# Patient Record
Sex: FEMALE | Race: WHITE | Employment: OTHER | URBAN - METROPOLITAN AREA
[De-identification: names, ages, dates, MRNs, and addresses within clinical notes are randomized per-mention and may not be internally consistent; named-entity substitution may affect disease eponyms.]

---

## 2017-08-23 ENCOUNTER — HOSPITAL ENCOUNTER (OUTPATIENT)
Dept: GENERAL RADIOLOGY | Age: 27
Discharge: HOME | End: 2017-08-23
Attending: FAMILY MEDICINE
Payer: COMMERCIAL

## 2017-08-23 DIAGNOSIS — M54.2 CERVICALGIA: ICD-10-CM

## 2017-08-23 DIAGNOSIS — M54.6 PAIN IN THORACIC SPINE: ICD-10-CM

## 2017-08-23 PROCEDURE — 72072 X-RAY EXAM THORAC SPINE 3VWS: CPT

## 2017-08-23 PROCEDURE — 72050 X-RAY EXAM NECK SPINE 4/5VWS: CPT

## 2018-01-01 ENCOUNTER — HOSPITAL ENCOUNTER (EMERGENCY)
Age: 28
Discharge: HOME | End: 2018-01-01
Attending: STUDENT IN AN ORGANIZED HEALTH CARE EDUCATION/TRAINING PROGRAM
Payer: COMMERCIAL

## 2018-01-01 VITALS
SYSTOLIC BLOOD PRESSURE: 140 MMHG | HEART RATE: 102 BPM | WEIGHT: 115.08 LBS | RESPIRATION RATE: 16 BRPM | HEIGHT: 61 IN | BODY MASS INDEX: 21.73 KG/M2 | DIASTOLIC BLOOD PRESSURE: 82 MMHG | OXYGEN SATURATION: 100 % | TEMPERATURE: 98.1 F

## 2018-01-01 DIAGNOSIS — H66.90 ACUTE OTITIS MEDIA, UNSPECIFIED OTITIS MEDIA TYPE: ICD-10-CM

## 2018-01-01 DIAGNOSIS — H92.09 EAR ACHE: Primary | ICD-10-CM

## 2018-01-01 PROCEDURE — 99282 EMERGENCY DEPT VISIT SF MDM: CPT

## 2018-01-01 RX ORDER — AZITHROMYCIN 250 MG/1
TABLET, FILM COATED ORAL
Qty: 6 TAB | Refills: 0 | Status: SHIPPED | OUTPATIENT
Start: 2018-01-01 | End: 2018-01-06

## 2018-01-01 RX ORDER — TRIAMCINOLONE ACETONIDE 55 UG/1
2 SPRAY, METERED NASAL DAILY
Qty: 1 BOTTLE | Refills: 0 | Status: SHIPPED | OUTPATIENT
Start: 2018-01-01 | End: 2018-10-11

## 2018-01-01 NOTE — ED PROVIDER NOTES
HPI Comments: 24 y.o. female with no significant past medical history who presents from home with chief complaint of right ear pain. The patient states that she hasn't been feeling well x 2 days and this am she awoke with right ear pain. The patient states that her daughter has had repeated bouts of strep all year. The patient states that she does smoke a PPD. The patient also states that she is also worried that she has a bad tooth in the back of her mouth. There are no other acute medical concerns at this time. PCP: Reyes Leeks, MD    Past Medical History:  No date: Diabetes [facility])  No date: Hypertension      The history is provided by the patient. No  was used. Past Medical History:   Diagnosis Date    Diabetes ([address]     Hypertension        Past Surgical History:   Procedure Laterality Date    HX  SECTION      x2    HX DILATION AND CURETTAGE           Family History:   Problem Relation Age of Onset    COPD Mother     Drug Abuse Mother     Diabetes Maternal Grandfather     Cancer Paternal Grandmother     Diabetes Paternal Grandfather        Social History     Social History    Marital status:      Spouse name: N/A    Number of children: N/A    Years of education: N/A     Occupational History    Not on file. Social History Main Topics    Smoking status: Current Every Day Smoker     Packs/day: 1.00    Smokeless tobacco: Never Used    Alcohol use 3.5 oz/week     7 Cans of beer per week      Comment: 1 per night    Drug use: No    Sexual activity: Yes     Partners: Male     Birth control/ protection:      Other Topics Concern    Not on file     Social History Narrative         ALLERGIES: Penicillins    Review of Systems   Constitutional: Positive for chills and fever (Subjective). Negative for diaphoresis. HENT: Positive for dental problem, ear pain and sore throat. Negative for congestion, rhinorrhea and trouble swallowing.     Eyes: Negative. Respiratory: Negative. Negative for shortness of breath. Cardiovascular: Negative. Gastrointestinal: Negative. Negative for abdominal pain, nausea and vomiting. Endocrine: Negative. Musculoskeletal: Negative for arthralgias, myalgias, neck pain and neck stiffness. Skin: Negative. Negative for rash. Allergic/Immunologic: Negative. Neurological: Negative. Negative for dizziness, syncope, weakness and headaches. Hematological: Negative. Psychiatric/Behavioral: Negative. Vitals:    01/01/18 1237   BP: 140/82   Pulse: (!) 102   Resp: 16   Temp: 98.1 °F (36.7 °C)   SpO2: 100%   Weight: 52.2 kg (115 lb 1.3 oz)   Height: 5' 1\" (1.549 m)            Physical Exam   Constitutional: She is oriented to person, place, and time. Vital signs are normal. She appears well-developed and well-nourished. Non-toxic appearance. She does not have a sickly appearance. She does not appear ill. HENT:   Head: Normocephalic and atraumatic. Right Ear: Hearing, external ear and ear canal normal. Tympanic membrane is erythematous. Left Ear: Hearing, tympanic membrane, external ear and ear canal normal.   Nose: Nose normal.   Mouth/Throat: Uvula is midline and mucous membranes are normal. Oropharyngeal exudate and posterior oropharyngeal erythema present. Eyes: Conjunctivae, EOM and lids are normal. Pupils are equal, round, and reactive to light. Neck: Trachea normal, normal range of motion and full passive range of motion without pain. Neck supple. Cardiovascular: Normal rate, regular rhythm, normal heart sounds and normal pulses. Pulmonary/Chest: Effort normal and breath sounds normal. She has no decreased breath sounds. Abdominal: Soft. Normal appearance and bowel sounds are normal.   Musculoskeletal: Normal range of motion. Neurological: She is alert and oriented to person, place, and time. She has normal strength. GCS eye subscore is 4. GCS verbal subscore is 5.  GCS motor subscore is 6. Skin: Skin is warm, dry and intact. Psychiatric: She has a normal mood and affect. Her speech is normal and behavior is normal. Judgment and thought content normal. Cognition and memory are normal.   Nursing note and vitals reviewed. MDM  Number of Diagnoses or Management Options  Acute otitis media, unspecified otitis media type: minor  Ear ache: minor  Risk of Complications, Morbidity, and/or Mortality  Presenting problems: minimal  Diagnostic procedures: minimal  Management options: minimal    Patient Progress  Patient progress: stable    ED Course       Procedures    LABORATORY TESTS:  No results found for this or any previous visit (from the past 12 hour(s)). IMAGING RESULTS:    MEDICATIONS GIVEN:  Medications - No data to display    IMPRESSION:  1. Ear ache    2. Acute otitis media, unspecified otitis media type        PLAN:  1. Azith tabs  2. Nasacort QD  3. F/U with PCP  Return to ED if worse    Discharge Note  1:05 PM  The patient is ready for discharge. The patient's signs, symptoms, diagnosis, and discharge instructions have been discussed and the patient has conveyed their understanding. The patient is to follow up as recommended or return to the ER should their symptoms worsen. Plan has been discussed and the patient is in agreement. Gerardo Montes Pagé FNP-BC.

## 2018-01-01 NOTE — ED TRIAGE NOTES
Pt ambulatory to treatment area with c/o \"right ear pain that started last night. \"  Pt denies fevers.

## 2018-01-01 NOTE — DISCHARGE INSTRUCTIONS
Earache: Care Instructions  Your Care Instructions    Even though infection is a common cause of ear pain, not all ear pain means an infection. If you have ear pain and don't have an infection, it could be because of a jaw problem, such as temporomandibular joint (TMJ) pain. Or it could be because of a neck problem. When ear discomfort or pain is mild or comes and goes without other symptoms, home treatment may be all you need. Follow-up care is a key part of your treatment and safety. Be sure to make and go to all appointments, and call your doctor if you are having problems. It's also a good idea to know your test results and keep a list of the medicines you take. How can you care for yourself at home? · Apply heat on the ear to ease pain. To apply heat, put a warm water bottle, a heating pad set on low, or a warm cloth on your ear. Do not go to sleep with a heating pad on your skin. · Take an over-the-counter pain medicine, such as acetaminophen (Tylenol), ibuprofen (Advil, Motrin), or naproxen (Aleve). Be safe with medicines. Read and follow all instructions on the label. · Do not take two or more pain medicines at the same time unless the doctor told you to. Many pain medicines have acetaminophen, which is Tylenol. Too much acetaminophen (Tylenol) can be harmful. · Never insert anything, such as a cotton swab or a joycelyn pin, into the ear. When should you call for help? Call your doctor now or seek immediate medical care if:  ? · You have new or worse symptoms of infection, such as:  ¨ Increased pain, swelling, warmth, or redness. ¨ Red streaks leading from the area. ¨ Pus draining from the area. ¨ A fever. ? Watch closely for changes in your health, and be sure to contact your doctor if:  ? · You have new or worse discharge coming from the ear. ? · You do not get better as expected. Where can you learn more? Go to http://anthony-venancio.info/.   Enter O383 in the search box to learn more about \"Earache: Care Instructions. \"  Current as of: May 12, 2017  Content Version: 11.4  © 2035-2042 [employer]. Care instructions adapted under license by [employer] (which disclaims liability or warranty for this information). If you have questions about a medical condition or this instruction, always ask your healthcare professional. [address] any warranty or liability for your use of this information. Ear Infection (Otitis Media): Care Instructions  Your Care Instructions    An ear infection may start with a cold and affect the middle ear (otitis media). It can hurt a lot. Most ear infections clear up on their own in a couple of days. Most often you will not need antibiotics. This is because many ear infections are caused by a virus. Antibiotics don't work against a virus. Regular doses of pain medicines are the best way to reduce your fever and help you feel better. Follow-up care is a key part of your treatment and safety. Be sure to make and go to all appointments, and call your doctor if you are having problems. It's also a good idea to know your test results and keep a list of the medicines you take. How can you care for yourself at home? · Take pain medicines exactly as directed. ¨ If the doctor gave you a prescription medicine for pain, take it as prescribed. ¨ If you are not taking a prescription pain medicine, take an over-the-counter medicine, such as acetaminophen (Tylenol), ibuprofen (Advil, Motrin), or naproxen (Aleve). Read and follow all instructions on the label. ¨ Do not take two or more pain medicines at the same time unless the doctor told you to. Many pain medicines have acetaminophen, which is Tylenol. Too much acetaminophen (Tylenol) can be harmful. · Plan to take a full dose of pain reliever before bedtime. Getting enough sleep will help you get better. · Try a warm, moist washcloth on the ear.  It may help relieve pain. · If your doctor prescribed antibiotics, take them as directed. Do not stop taking them just because you feel better. You need to take the full course of antibiotics. When should you call for help? Call your doctor now or seek immediate medical care if:  ? · You have new or increasing ear pain. ? · You have new or increasing pus or blood draining from your ear. ? · You have a fever with a stiff neck or a severe headache. ? Watch closely for changes in your health, and be sure to contact your doctor if:  ? · You have new or worse symptoms. ? · You are not getting better after taking an antibiotic for 2 days. Where can you learn more? Go to http://anthonyThreadboxvenancio.info/. Enter G090 in the search box to learn more about \"Ear Infection (Otitis Media): Care Instructions. \"  Current as of: May 12, 2017  Content Version: 11.4  © 0261-1152 [employer]. Care instructions adapted under license by [employer] (which disclaims liability or warranty for this information). If you have questions about a medical condition or this instruction, always ask your healthcare professional. [place] [address] any warranty or liability for your use of this information. We hope that we have addressed all of your medical concerns. The examination and treatment you received in the Emergency Department were for an emergent problem and were not intended as complete care. It is important that you follow up with your healthcare provider(s) for ongoing care. If your symptoms worsen or do not improve as expected, and you are unable to reach your usual health care provider(s), you should return to the Emergency Department. Today's healthcare is undergoing tremendous change, and patient satisfaction surveys are one of the many tools to assess the quality of medical care.   You may receive a survey from the [employer] organization regarding your experience in the Emergency Department. I hope that your experience has been completely positive, particularly the medical care that I provided. As such, please participate in the survey; anything less than excellent does not meet my expectations or intentions. [facility] and [facility] participate in nationally recognized quality of care measures. If your blood pressure is greater than 120/80, as reported below, we urge that you seek medical care to address the potential of high blood pressure, commonly known as hypertension. Hypertension can be hereditary or can be caused by certain medical conditions, pain, stress, or \"white coat syndrome. \"       Please make an appointment with your health care provider(s) for follow up of your Emergency Department visit. VITALS:   Patient Vitals for the past 8 hrs:   Temp Pulse Resp BP SpO2   01/01/18 1237 98.1 °F (36.7 °C) (!) 102 16 140/82 100 %          Thank you for allowing us to provide you with medical care today. We realize that you have many choices for your emergency care needs. Please choose us in the future for any continued health care needs. EDDIE [address]: [phone]            No results found for this or any previous visit (from the past 24 hour(s)). No results found.

## 2018-09-27 ENCOUNTER — HOSPITAL ENCOUNTER (EMERGENCY)
Age: 28
Discharge: HOME | End: 2018-09-27
Attending: EMERGENCY MEDICINE
Payer: MEDICAID

## 2018-09-27 VITALS
OXYGEN SATURATION: 100 % | RESPIRATION RATE: 14 BRPM | TEMPERATURE: 98.6 F | WEIGHT: 122.58 LBS | SYSTOLIC BLOOD PRESSURE: 125 MMHG | DIASTOLIC BLOOD PRESSURE: 72 MMHG | HEIGHT: 61 IN | BODY MASS INDEX: 23.14 KG/M2 | HEART RATE: 85 BPM

## 2018-09-27 DIAGNOSIS — K04.7 INFECTED DENTAL CARRIES: Primary | ICD-10-CM

## 2018-09-27 DIAGNOSIS — K02.9 INFECTED DENTAL CARRIES: Primary | ICD-10-CM

## 2018-09-27 PROCEDURE — 99283 EMERGENCY DEPT VISIT LOW MDM: CPT

## 2018-09-27 PROCEDURE — 74011000250 HC RX REV CODE- 250: Performed by: EMERGENCY MEDICINE

## 2018-09-27 PROCEDURE — 74011250637 HC RX REV CODE- 250/637: Performed by: EMERGENCY MEDICINE

## 2018-09-27 RX ORDER — CLINDAMYCIN HYDROCHLORIDE 300 MG/1
300 CAPSULE ORAL 3 TIMES DAILY
Qty: 21 CAP | Refills: 0 | Status: SHIPPED | OUTPATIENT
Start: 2018-09-27 | End: 2018-10-04

## 2018-09-27 RX ORDER — GABAPENTIN 300 MG/1
100 CAPSULE ORAL 3 TIMES DAILY
COMMUNITY

## 2018-09-27 RX ORDER — NAPROXEN 500 MG/1
500 TABLET ORAL 2 TIMES DAILY WITH MEALS
Qty: 20 TAB | Refills: 0 | Status: SHIPPED | OUTPATIENT
Start: 2018-09-27 | End: 2018-10-07

## 2018-09-27 RX ORDER — OXYCODONE AND ACETAMINOPHEN 5; 325 MG/1; MG/1
1 TABLET ORAL
Qty: 10 TAB | Refills: 0 | Status: SHIPPED | OUTPATIENT
Start: 2018-09-27 | End: 2018-09-27 | Stop reason: ALTCHOICE

## 2018-09-27 RX ADMIN — LIDOCAINE HYDROCHLORIDE: 20 SOLUTION ORAL; TOPICAL at 18:23

## 2018-09-27 NOTE — ED NOTES
Patient given dental balls. Patient hesitant to use dental balls. States \"they smell like they make me want to throw up\". Asking about prescriptions and if she can take motrin. Educated patient regarding motrin and prescribed Naproxen. Patient verbalizes understanding. The patient was discharged home by Dr. Indu Rodriguez in stable condition. The patient is alert and oriented, in no respiratory distress. The patient's diagnosis, condition and treatment were explained. The patient expressed understanding. Two prescriptions given. No work/school note given. A discharge plan has been developed. A  was not involved in the process. Aftercare instructions were given. Pt ambulatory out of the ED.

## 2018-09-27 NOTE — DISCHARGE INSTRUCTIONS
Tooth Decay: Care Instructions  Your Care Instructions    Tooth decay is damage to a tooth caused by plaque. Plaque is a thin film of bacteria that sticks to the teeth above and below the gum line. If plaque isn't removed from the teeth, it can build up and harden into tartar. The bacteria in plaque and tartar use sugars in food to make acids. These acids can cause tooth decay and gum disease. Any part of your tooth can decay, from the roots below the gum line to the chewing surface. Decay can affect the outer layer (enamel) or inner layer (dentin) of your teeth. The deeper the decay, the worse the damage. Untreated tooth decay will get worse and may lead to tooth loss. If you have a small hole (cavity) in your tooth, your dentist can repair it by removing the decay and filling the hole. If you have deeper decay, you may need more treatment. A very badly damaged tooth may have to be removed. Follow-up care is a key part of your treatment and safety. Be sure to make and go to all appointments, and call your dentist if you are having problems. It's also a good idea to know your test results and keep a list of the medicines you take. How can you care for yourself at home? If you have pain:  · Take an over-the-counter pain medicine, such as acetaminophen (Tylenol), ibuprofen (Advil, Motrin), or naproxen (Aleve). Be safe with medicines. Read and follow all instructions on the label. ¨ Do not take two or more pain medicines at the same time unless the doctor told you to. Many pain medicines have acetaminophen, which is Tylenol. Too much acetaminophen (Tylenol) can be harmful. · Put ice or a cold pack on your cheek over the tooth for 10 to 15 minutes at a time. Put a thin cloth between the ice and your skin. To prevent tooth decay  · Brush teeth twice a day, and floss once a day. Brushing with fluoride toothpaste and flossing may be enough to reverse early decay.   · Use a toothbrush with soft, rounded-end bristles and a head that is small enough to reach all parts of your teeth and mouth. Replace your toothbrush every 3 or 4 months. You may also use an electric toothbrush that has rotating and oscillating (back-and-forth) action. · Ask your dentist about having fluoride treatments at the dental office. · Brush your tongue to help get rid of bacteria. · Eat healthy foods that include whole grains, vegetables, and fruits. · Have your teeth cleaned by a professional at least two times a year. · Do not smoke or use smokeless tobacco. Tobacco can make tooth decay worse. When should you call for help? Call 911 anytime you think you may need emergency care. For example, call if:    · You have trouble breathing.    Call your dentist now or seek immediate medical care if:    · You have new or worse symptoms of infection, such as:  ¨ Increased pain, swelling, warmth, or redness. ¨ Red streaks leading from the area. ¨ Pus draining from the area. ¨ A fever.    Watch closely for changes in your health, and be sure to contact your doctor if:    · You do not get better as expected. Where can you learn more? Go to http://anthony-venancio.info/. Enter C988 in the search box to learn more about \"Tooth Decay: Care Instructions. \"  Current as of: May 12, 2017  Content Version: 11.7  © 9094-6635 [employer]. Care instructions adapted under license by [employer] (which disclaims liability or warranty for this information). If you have questions about a medical condition or this instruction, always ask your healthcare professional. [place] [address] any warranty or liability for your use of this information. Abscessed Tooth: Care Instructions  Your Care Instructions    An abscessed tooth is a tooth that has a pocket of pus in the tissues around it. Pus forms when the body tries to fight an infection caused by bacteria.  If the pus cannot drain, it forms an abscess. An abscessed tooth can cause red, swollen gums and throbbing pain, especially when you chew. You may have a bad taste in your mouth and a fever, and your jaw may swell. Damage to the tooth, untreated tooth decay, or gum disease can cause an abscessed tooth. An abscessed tooth needs to be treated by a dental professional right away. If it is not treated, the infection could spread to other parts of your body. Your dentist will give you antibiotics to stop the infection. He or she may make a hole in the tooth or cut open (venice) the abscess inside your mouth so that the infection can drain, which should relieve your pain. You may need to have a root canal treatment, which tries to save your tooth by taking out the infected pulp and replacing it with a healing medicine and/or a filling. If these treatments do not work, your tooth may have to be removed. Follow-up care is a key part of your treatment and safety. Be sure to make and go to all appointments, and call your doctor if you are having problems. It's also a good idea to know your test results and keep a list of the medicines you take. How can you care for yourself at home? · Reduce pain and swelling in your face and jaw by putting ice or a cold pack on the outside of your cheek for 10 to 20 minutes at a time. Put a thin cloth between the ice and your skin. · Take pain medicines exactly as directed. ¨ If the doctor gave you a prescription medicine for pain, take it as prescribed. ¨ If you are not taking a prescription pain medicine, ask your doctor if you can take an over-the-counter medicine. · Take your antibiotics as directed. Do not stop taking them just because you feel better. You need to take the full course of antibiotics. To prevent tooth abscess  · Brush and floss every day, and have regular dental checkups. · Eat a healthy diet, and avoid sugary foods and drinks.   · Do not smoke, use e-cigarettes with nicotine, or use spit tobacco. Tobacco and nicotine slow your ability to heal. Tobacco also increases your risk for gum disease and cancer of the mouth and throat. If you need help quitting, talk to your doctor about stop-smoking programs and medicines. These can increase your chances of quitting for good. When should you call for help? Call 911 anytime you think you may need emergency care. For example, call if:    · You have trouble breathing.    Call your doctor now or seek immediate medical care if:    · You have new or worse symptoms of infection, such as:  ¨ Increased pain, swelling, warmth, or redness. ¨ Red streaks leading from the area. ¨ Pus draining from the area. ¨ A fever.    Watch closely for changes in your health, and be sure to contact your doctor if:    · You do not get better as expected. Where can you learn more? Go to http://anthony-venancio.info/. Enter S686 in the search box to learn more about \"Abscessed Tooth: Care Instructions. \"  Current as of: May 12, 2017  Content Version: 11.7  © 6016-6302 [employer]. Care instructions adapted under license by [employer] (which disclaims liability or warranty for this information). If you have questions about a medical condition or this instruction, always ask your healthcare professional. [address] any warranty or liability for your use of this information.

## 2018-09-27 NOTE — ED TRIAGE NOTES
Patient c/o 1 day of right lower rear broken tooth pain. \"I think it was my wisdom tooth. \"  My BS was 98 today.

## 2018-09-27 NOTE — ED PROVIDER NOTES
HPI Comments: The patient is a 53-year-old female with a past medical history significant for hypertension, diabetes, anxiety , who presents to the ED with a complaint of toothache that began approximately 2 days ago. She describes a dull, throbbing and achy discomfort, severity 8/10, located to the right side of the jaw and mandible, accompanied by sensitivity to cold and heat. She denies any fever, sore throat, cough, congestion, difficulty swallowing. Patient is a 34 y.o. female presenting with dental problem. Dental Pain Past Medical History:  
Diagnosis Date  Diabetes ([address]  Hypertension Past Surgical History:  
Procedure Laterality Date  HX  SECTION    
 x2  
 HX DILATION AND CURETTAGE   Family History:  
Problem Relation Age of Onset  COPD Mother  Drug Abuse Mother  Diabetes Maternal Grandfather  Cancer Paternal Grandmother  Diabetes Paternal Grandfather Social History Social History  Marital status:  Spouse name: N/A  
 Number of children: N/A  
 Years of education: N/A Occupational History  Not on file. Social History Main Topics  Smoking status: Current Every Day Smoker Packs/day: 1.00  Smokeless tobacco: Never Used  Alcohol use 3.5 oz/week 7 Cans of beer per week Comment: 1 per night  Drug use: No  
 Sexual activity: Yes  
  Partners: Male Birth control/ protection:   
 
Other Topics Concern  Not on file Social History Narrative ALLERGIES: Penicillins Review of Systems HENT: Positive for dental problem. All other systems reviewed and are negative. Vitals:  
 18 1743 BP: 125/72 Pulse: 85 Resp: 14 Temp: 98.6 °F (37 °C) SpO2: 100% Weight: 55.6 kg (122 lb 9.2 oz) Height: 5' 1\" (1.549 m) Physical Exam  
Nursing note and vitals reviewed. CONSTITUTIONAL: Well-appearing; well-nourished; in no apparent distress HEAD: Normocephalic; atraumatic EYES: PERRL; EOM intact; conjunctiva and sclera are clear bilaterally. ENT: Infected dental carries. No rhinorrhea; normal pharynx with no tonsillar hypertrophy; mucous membranes pink/moist, no erythema, no exudate. NECK: Supple; non-tender; no cervical lymphadenopathy CARD: Normal S1, S2; no murmurs, rubs, or gallops. Regular rate and rhythm. RESP: Normal respiratory effort; breath sounds clear and equal bilaterally; no wheezes, rhonchi, or rales. ABD: Normal bowel sounds; non-distended; non-tender; no palpable organomegaly, no masses, no bruits. Back Exam: Normal inspection; no vertebral point tenderness, no CVA tenderness. Normal range of motion. EXT: Normal ROM in all four extremities; non-tender to palpation; no swelling or deformity; distal pulses are normal, no edema. SKIN: Warm; dry; no rash. NEURO:Alert and oriented x 3, coherent, TRIP-XII grossly intact, sensory and motor are non-focal. 
 
 
 
MDM Number of Diagnoses or Management Options Infected dental carries:  
Diagnosis management comments: Assessment: Infected Dental carries Plan:  Education and reassurance/ Symptomatic treatment/ Monitor and Reevaluate. Amount and/or Complexity of Data Reviewed Clinical lab tests: ordered and reviewed Tests in the radiology section of CPT®: ordered and reviewed Tests in the medicine section of CPT®: reviewed and ordered Discussion of test results with the performing providers: yes Decide to obtain previous medical records or to obtain history from someone other than the patient: yes Obtain history from someone other than the patient: yes Review and summarize past medical records: yes Discuss the patient with other providers: yes Independent visualization of images, tracings, or specimens: yes Risk of Complications, Morbidity, and/or Mortality Presenting problems: low Diagnostic procedures: low Management options: low ED Course Procedures Progress Note:  
Pt has been reexamined by Carissa Sousa MD. Pt is feeling much better. Symptoms have improved. All available results have been reviewed with pt and any available family. Pt understands sx, dx, and tx in ED. Care plan has been outlined and questions have been answered. Pt is ready to go home. Will send home on Infected Dental carries. Prescription of Naproxen/ Clindamycin. Outpatient referral with Dentistry as needed. Written by Carissa Sousa MD,7:10 PM 
 
 
 
. Caitlyn Mejia

## 2018-10-11 ENCOUNTER — OFFICE VISIT (OUTPATIENT)
Dept: FAMILY MEDICINE CLINIC | Age: 28
End: 2018-10-11

## 2018-10-11 VITALS
SYSTOLIC BLOOD PRESSURE: 124 MMHG | RESPIRATION RATE: 16 BRPM | WEIGHT: 126 LBS | DIASTOLIC BLOOD PRESSURE: 80 MMHG | HEART RATE: 85 BPM | OXYGEN SATURATION: 98 % | BODY MASS INDEX: 23.79 KG/M2 | TEMPERATURE: 97.8 F | HEIGHT: 61 IN

## 2018-10-11 DIAGNOSIS — F90.2 ATTENTION DEFICIT HYPERACTIVITY DISORDER (ADHD), COMBINED TYPE: ICD-10-CM

## 2018-10-11 DIAGNOSIS — Z28.21 REFUSED INFLUENZA VACCINE: ICD-10-CM

## 2018-10-11 DIAGNOSIS — E10.69 TYPE 1 DIABETES MELLITUS WITH OTHER SPECIFIED COMPLICATION (HCC): Primary | ICD-10-CM

## 2018-10-11 DIAGNOSIS — F41.0 PANIC ATTACKS: ICD-10-CM

## 2018-10-11 RX ORDER — DEXTROAMPHETAMINE SACCHARATE, AMPHETAMINE ASPARTATE, DEXTROAMPHETAMINE SULFATE AND AMPHETAMINE SULFATE 3.75; 3.75; 3.75; 3.75 MG/1; MG/1; MG/1; MG/1
15 TABLET ORAL 2 TIMES DAILY
Qty: 60 TAB | Refills: 0 | Status: SHIPPED | OUTPATIENT
Start: 2018-10-11 | End: 2018-11-13 | Stop reason: SDUPTHER

## 2018-10-11 RX ORDER — DEXTROAMPHETAMINE SACCHARATE, AMPHETAMINE ASPARTATE, DEXTROAMPHETAMINE SULFATE AND AMPHETAMINE SULFATE 3.75; 3.75; 3.75; 3.75 MG/1; MG/1; MG/1; MG/1
15 TABLET ORAL 2 TIMES DAILY
COMMUNITY
End: 2018-10-11 | Stop reason: SDUPTHER

## 2018-10-11 NOTE — PROGRESS NOTES
I reviewed with the resident the medical history and the resident's findings on the physical examination. I discussed with the resident the patient's diagnosis and concur with the plan. Will request records from prior PCP. She needs to be seeing Endocrinology and Psychiatry as well given her medical conditions.

## 2018-10-11 NOTE — MR AVS SNAPSHOT
[address] [address] 
[phone] Patient: Luisa Lewis MRN: [id number] Jm Cuevas Visit Information Date & Time Provider Department Dept. Phone Encounter #  
 10/11/2018  1:50 PM Jyothi Neal  [facility] [phone] [id number] Follow-up Instructions Return in about 1 month (around 11/11/2018) for ADHD. Upcoming Health Maintenance Date Due  
 FOOT EXAM Q1 8/7/2002 EYE EXAM RETINAL OR DILATED Q1 8/7/2002 HPV Age 9Y-34Y (1 of 3 - Female 3 Dose Series) 8/7/2003 Pneumococcal 19-64 Medium Risk (1 of 1 - PPSV23) 8/7/2011 DTaP/Tdap/Td series (1 - Tdap) 8/7/2013 HEMOGLOBIN A1C Q6M 8/10/2014 MICROALBUMIN Q1 8/19/2014 LIPID PANEL Q1 8/19/2014 Influenza Age 5 to Adult 3/31/2019* PAP AKA CERVICAL CYTOLOGY 5/20/2019 *Topic was postponed. The date shown is not the original due date. Allergies as of 10/11/2018  Review Complete On: 10/11/2018 By: Jyothi Neal MD  
  
 Severity Noted Reaction Type Reactions Amoxicillin  10/11/2018    Hives Morphine  10/11/2018    Nausea and Vomiting Penicillins  08/04/2012    Hives Current Immunizations  Never Reviewed No immunizations on file. Not reviewed this visit You Were Diagnosed With   
  
 Codes Comments Type 1 diabetes mellitus with other specified complication (HCC)    -  Primary ICD-10-CM: E10.69 ICD-9-CM: 250.81 Attention deficit hyperactivity disorder (ADHD), combined type     ICD-10-CM: F90.2 ICD-9-CM: 314.01 Panic attacks     ICD-10-CM: F41.0 ICD-9-CM: 300.01 Vitals BP Pulse Temp Resp Height(growth percentile) Weight(growth percentile) 124/80 (BP 1 Location: Left arm, BP Patient Position: Sitting) 85 97.8 °F (36.6 °C) (Oral) 16 5' 1\" (1.549 m) 126 lb (57.2 kg) LMP SpO2 BMI OB Status Smoking Status 09/25/2018 98% 23.81 kg/m2 Having regular periods Current Every Day Smoker Vitals History BMI and BSA Data Body Mass Index Body Surface Area  
 23.81 kg/m 2 1.57 m 2 Preferred Pharmacy Pharmacy Name Phone [facility]/PHARMACY #[id number] , [address] [place] [address]  [phone] Your Updated Medication List  
  
   
This list is accurate as of 10/11/18  3:13 PM.  Always use your most recent med list.  
  
  
  
  
 ALPRAZolam 0.5 mg tablet Commonly known as:  XANAX  
0.25 mg.  
  
 butalbital-acetaminophen-caffeine -40 mg per tablet Commonly known as:  Billie Moh Take 1 Tab by mouth every four (4) hours as needed for Pain or Headache. Max Daily Amount: 6 Tabs. dextroamphetamine-amphetamine 15 mg tablet Commonly known as:  ADDERALL Take 1 Tab (15 mg total) by mouth two (2) times a day. Max Daily Amount: 30 mg  
  
 GLUCAGON EMERGENCY KIT (HUMAN) 1 mg injection Generic drug:  glucagon  
  
 glucose blood VI test strips strip Commonly known as:  ONETOUCH ULTRA TEST  
TEST 5 TIMES A DAY  
  
 LEVEMIR U-100 INSULIN 100 unit/mL injection Generic drug:  insulin detemir U-100  
by SubCUTAneous route two (2) times a day. NEURONTIN 300 mg capsule Generic drug:  gabapentin Take 100 mg by mouth three (3) times daily. NovoLOG U-100 Insulin aspart 100 unit/mL injection Generic drug:  insulin aspart U-100  
5 times a day Prescriptions Printed Refills  
 dextroamphetamine-amphetamine (ADDERALL) 15 mg tablet 0 Sig: Take 1 Tab (15 mg total) by mouth two (2) times a day. Max Daily Amount: 30 mg  
 Class: Print Route: Oral  
  
Prescriptions Sent to Pharmacy Refills  
 glucose blood VI test strips (ONETOUCH ULTRA TEST) strip 1 Sig: TEST 5 TIMES A DAY Class: Normal  
 Pharmacy: [facility]/pharmacy #[id number] , [address] [place] [address]  Ph #: [phone] We Performed the Following REFERRAL TO NEUROPSYCHOLOGY [TQG95 Custom] Comments:  
 ADHD Follow-up Instructions Return in about 1 month (around 11/11/2018) for ADHD. Referral Information Referral ID Referred By Referred To  
  
 [id number] Elisabet Bijan Sabi[address] [facility] [address] [place] [address], [address] [address] Phone: [phone] Fax: [phone] Visits Status Start Date End Date 1 New Request 10/11/18 10/11/19 If your referral has a status of pending review or denied, additional information will be sent to support the outcome of this decision. Patient Instructions A Healthy Lifestyle: Care Instructions Your Care Instructions A healthy lifestyle can help you feel good, stay at a healthy weight, and have plenty of energy for both work and play. A healthy lifestyle is something you can share with your whole family. A healthy lifestyle also can lower your risk for serious health problems, such as high blood pressure, heart disease, and diabetes. You can follow a few steps listed below to improve your health and the health of your family. Follow-up care is a key part of your treatment and safety. Be sure to make and go to all appointments, and call your doctor if you are having problems. It's also a good idea to know your test results and keep a list of the medicines you take. How can you care for yourself at home? · Do not eat too much sugar, fat, or fast foods. You can still have dessert and treats now and then. The goal is moderation. · Start small to improve your eating habits. Pay attention to portion sizes, drink less juice and soda pop, and eat more fruits and vegetables. ¨ Eat a healthy amount of food. A 3-ounce serving of meat, for example, is about the size of a deck of cards. Fill the rest of your plate with vegetables and whole grains. ¨ Limit the amount of soda and sports drinks you have every day. Drink more water when you are thirsty. ¨ Eat at least 5 servings of fruits and vegetables every day. It may seem like a lot, but it is not hard to reach this goal. A serving or helping is 1 piece of fruit, 1 cup of vegetables, or 2 cups of leafy, raw vegetables. Have an apple or some carrot sticks as an afternoon snack instead of a candy bar. Try to have fruits and/or vegetables at every meal. 
· Make exercise part of your daily routine. You may want to start with simple activities, such as walking, bicycling, or slow swimming. Try to be active 30 to 60 minutes every day. You do not need to do all 30 to 60 minutes all at once. For example, you can exercise 3 times a day for 10 or 20 minutes. Moderate exercise is safe for most people, but it is always a good idea to talk to your doctor before starting an exercise program. 
· Keep moving. [employer] the lawn, work in the garden, or [place]. Take the stairs instead of the elevator at work. · If you smoke, quit. People who smoke have an increased risk for heart attack, stroke, cancer, and other lung illnesses. Quitting is hard, but there are ways to boost your chance of quitting tobacco for good. ¨ Use nicotine gum, patches, or lozenges. ¨ Ask your doctor about stop-smoking programs and medicines. ¨ Keep trying. In addition to reducing your risk of diseases in the future, you will notice some benefits soon after you stop using tobacco. If you have shortness of breath or asthma symptoms, they will likely get better within a few weeks after you quit. · Limit how much alcohol you drink. Moderate amounts of alcohol (up to 2 drinks a day for men, 1 drink a day for women) are okay. But drinking too much can lead to liver problems, high blood pressure, and other health problems. Family health If you have a family, there are many things you can do together to improve your health. · Eat meals together as a family as often as possible. · Eat healthy foods. This includes fruits, vegetables, lean meats and dairy, and whole grains. · Include your family in your fitness plan. Most people think of activities such as jogging or tennis as the way to fitness, but there are many ways you and your family can be more active. Anything that makes you breathe hard and gets your heart pumping is exercise. Here are some tips: 
¨ Walk to do errands or to take your child to school or the bus. ¨ Go for a family bike ride after dinner instead of watching TV. Where can you learn more? Go to http://anthony[employer]ict[employer]Sciencesvenancio.info/. Enter E196 in the search box to learn more about \"A Healthy Lifestyle: Care Instructions. \" Current as of: December 7, 2017 Content Version: 11.8 © 8821-5222 [employer]. Care instructions adapted under license by [employer] (which disclaims liability or warranty for this information). If you have questions about a medical condition or this instruction, always ask your healthcare professional. [address] any warranty or liability for your use of this information. HPV (Human Papillomavirus) Vaccine Gardasil®: What You Need to Know What is HPV? Genital human papillomavirus (HPV) is the most common sexually transmitted virus in the [place]. More than half of sexually active men and women are infected with HPV at some time in their lives. About 20 million Americans are currently infected, and about 6 million more get infected each year. HPV is usually spread through sexual contact. Most HPV infections don't cause any symptoms, and go away on their own. But HPV can cause cervical cancer in women. Cervical cancer is the 2nd leading cause of cancer deaths among women around the world. In the [place], about 12,000 women get cervical cancer every year and about 4,000 are expected to die from it. HPV is also associated with several less common cancers, such as vaginal and vulvar cancers in women, and anal and oropharyngeal (back of the throat, including base of tongue and tonsils) cancers in both men and women. HPV can also cause genital warts and warts in the throat. There is no cure for HPV infection, but some of the problems it causes can be treated. HPV vaccineWhy get vaccinated? The HPV vaccine you are getting is one of two vaccines that can be given to prevent HPV. It may be given to both males and females. This vaccine can prevent most cases of cervical cancer in females, if it is given before exposure to the virus. In addition, it can prevent vaginal and vulvar cancer in females, and genital warts and anal cancer in both males and females. Protection from HPV vaccine is expected to be long-lasting. But vaccination is not a substitute for cervical cancer screening. Women should still get regular Pap tests. Who should get this HPV vaccine and when? HPV vaccine is given as a 3-dose series · 1st Dose: Now 
· 2nd Dose: 1 to 2 months after Dose 1 · 3rd Dose: 6 months after Dose 1 Additional (booster) doses are not recommended. Routine vaccination · This HPV vaccine is recommended for girls and boys 6or 15years of age. It may be given starting at age 5. Why is HPV vaccine recommended at 6or 15years of age? HPV infection is easily acquired, even with only one sex partner. That is why it is important to get HPV vaccine before any sexual contact takes place. Also, response to the vaccine is better at this age than at older ages. Catch-up vaccination This vaccine is recommended for the following people who have not completed the 3-dose series: · Females 15 through 32years of age · Males 15 through 24years of age This vaccine may be given to men 25 through 32years of age who have not completed the 3-dose series.  
It is recommended for men through age 32 who have sex with men or whose immune system is weakened because of HIV infection, other illness, or medications. HPV vaccine may be given at the same time as other vaccines. Some people should not get HPV vaccine or should wait · Anyone who has ever had a life-threatening allergic reaction to any component of HPV vaccine, or to a previous dose of HPV vaccine, should not get the vaccine. Tell your doctor if the person getting vaccinated has any severe allergies, including an allergy to yeast. 
· HPV vaccine is not recommended for pregnant women. However, receiving HPV vaccine when pregnant is not a reason to consider terminating the pregnancy. Women who are breast feeding may get the vaccine. · People who are mildly ill when a dose of HPV vaccine is planned can still be vaccinated. People with a moderate or severe illness should wait until they are better. What are the risks from this vaccine? This HPV vaccine has been used in the [place] and around the world for about six years and has been very safe. However, any medicine could possibly cause a serious problem, such as a severe allergic reaction. The risk of any vaccine causing a serious injury, or death, is extremely small. Life-threatening allergic reactions from vaccines are very rare. If they do occur, it would be within a few minutes to a few hours after the vaccination. Several mild to moderate problems are known to occur with this HPV vaccine. These do not last long and go away on their own. · Reactions in the arm where the shot was given: 
¨ Pain (about 8 people in 10) ¨ Redness or swelling (about 1 person in 4) · Fever ¨ Mild (100°F) (about 1 person in 10) ¨ Moderate (102°F) (about 1 person in 72) · Other problems: 
¨ Headache (about 1 person in 3) · Fainting: Brief fainting spells and related symptoms (such as jerking movements) can happen after any medical procedure, including vaccination.  Sitting or lying down for about 15 minutes after a vaccination can help prevent fainting and injuries caused by falls. Tell your doctor if the patient feels dizzy or light-headed, or has vision changes or ringing in the ears. Like all vaccines, HPV vaccines will continue to be monitored for unusual or severe problems. What if there is a serious reaction? What should I look for? · Look for anything that concerns you, such as signs of a severe allergic reaction, very high fever, or behavior changes. Signs of a severe allergic reaction can include hives, swelling of the face and throat, difficulty breathing, a fast heartbeat, dizziness, and weakness. These would start a few minutes to a few hours after the vaccination. What should I do? · If you think it is a severe allergic reaction or other emergency that can't wait, call 9-1-1 or get the person to the nearest hospital. Otherwise, call your doctor. · Afterward, the reaction should be reported to the Vaccine Adverse Event Reporting System (VAERS). Your doctor might file this report, or you can do it yourself through the VAERS web site at www.vaers. [facility].gov, or by calling 7-278.830.4867. VAERS is only for reporting reactions. They do not give medical advice. The National Vaccine Injury Compensation Program 
The National Vaccine Injury Compensation Program (VICP) is a federal program that was created to compensate people who may have been injured by certain vaccines. Persons who believe they may have been injured by a vaccine can learn about the program and about filing a claim by calling 5-991.143.1993 or visiting the [address] website at www.[facility].gov/vaccinecompensation. How can I learn more? · Ask your doctor. · Call your local or state health department. · Contact the Centers for Disease Control and Prevention (CDC): 
¨ Call 3-536.847.9407 (5-388-EJF-INFO) or ¨ Visit the CDC's website at www.cdc.gov/vaccines. Vaccine Information Statement (Interim) HPV Vaccine (Gardasil) 
(5/17/2013) [address] [id number] Department of Health and [employer] Centers for Disease Control and Prevention Many Vaccine Information Statements are available in [place] and other languages. See www.immunize.org/vis. Muchas hojas de información sobre vacunas están disponibles en español y en otros idiomas. Visite www.immunize.org/vis. Care instructions adapted under license by [employer] (which disclaims liability or warranty for this information). If you have questions about a medical condition or this instruction, always ask your healthcare professional. [address] any warranty or liability for your use of this information. Introducing [facility]! Dear Erickson Torres: Thank you for requesting a SureSpeak account. Our records indicate that you already have an active SureSpeak account. You can access your account anytime at [email or web address] Did you know that you can access your hospital and ER discharge instructions at any time in SureSpeak? You can also review all of your test results from your hospital stay or ER visit. Additional Information If you have questions, please visit the Frequently Asked Questions section of the SureSpeak website at [email or web address] Remember, SureSpeak is NOT to be used for urgent needs. For medical emergencies, dial 911. Now available from your iPhone and Android! Please provide this summary of care documentation to your next provider. Your primary care clinician is listed as Nasra Henning. If you have any questions after today's visit, please call [phone].

## 2018-10-11 NOTE — PROGRESS NOTES
Reviewed record in preparation for visit and have obtained necessary documentation. Patient did not bring medications to visit for review. Information provided on Advanced Directive, Living Will. Body mass index is 23.81 kg/(m^2). Health Maintenance Due Topic Date Due  
 FOOT EXAM Q1  08/07/2002  
 EYE EXAM RETINAL OR DILATED Q1  08/07/2002  HPV Age 9Y-34Y (1 of 3 - Female 3 Dose Series) 08/07/2003  Pneumococcal 19-64 Medium Risk (1 of 1 - PPSV23) 08/07/2011  
 DTaP/Tdap/Td series (1 - Tdap) 08/07/2013  
 HEMOGLOBIN A1C Q6M  08/10/2014  MICROALBUMIN Q1  08/19/2014  LIPID PANEL Q1  08/19/2014  Influenza Age 5 to Adult  08/01/2018

## 2018-10-11 NOTE — PROGRESS NOTES
[facility] Subjective:  
Errol Hsu is a 34 y.o. female with history of ADHD and Panic Attacks, type 1 diabetes CC: Establish Care, ADHD, Panic Attacks History provided by patient and Records HPI: 
ADHD: Patient was formally diagnosed in childhood and was on Adderall as child. Patient reports working as  and has significant issues with completing final details in tasks, avoids projects requiring prolonged concentration/thinking, notes being very figdety off of medication, and difficulty with keeping appointments straight. Denies any testing as adult Panic Attacks: Chronic issue starting , Controlled with Xanax PRN, does not take on a daily basis. Panic Attacks include heart racing, SOB, Sweats, and sometimes \"blacking out\". Not currently seeing Psychiatry. Type 1 Diabetes: Levemir 36 Morning and 12 at night. SSI Novolog for meals and snacks as needed. States BG is generally well controlled in the 150's range, checks BG 5 times daily DKA twice in the past, once about 3 years ago and once as a child. Patient denies seeing endocrinologist currently or recently. Does not remember last A1C. Patient is a current smoker. Takes Gabapentin 300 mg BID for neuropathic pain. Patient sees Opthalmology regularly. Diet: Cheese Cake is favorite food, Mixed diet but not getting at least 5 servings vegetables a day. Exercise: Not organized exrecise but works as . PFSH:  
 
Current Outpatient Prescriptions on File Prior to Visit Medication Sig Dispense Refill  gabapentin (NEURONTIN) 300 mg capsule Take 100 mg by mouth three (3) times daily.  butalbital-acetaminophen-caffeine (FIORICET, ESGIC) -40 mg per tablet Take 1 Tab by mouth every four (4) hours as needed for Pain or Headache. Max Daily Amount: 6 Tabs. 20 Tab 1  ALPRAZolam (XANAX) 0.5 mg tablet 0.25 mg.    
 GLUCAGON EMERGENCY KIT, HUMAN, 1 mg injection  NOVOLOG 100 unit/mL injection 5 times a day  insulin detemir (LEVEMIR) 100 unit/mL injection by SubCUTAneous route two (2) times a day.  glucose blood VI test strips (ONE TOUCH ULTRA TEST) strip TEST 5 TIMES A  Strip 1 No current facility-administered medications on file prior to visit. Past Surgical History:  
Procedure Laterality Date  HX  SECTION    
 x3  
 HX DILATION AND CURETTAGE  2015  HX WISDOM TEETH EXTRACTION Family History Problem Relation Age of Onset  COPD Mother  Drug Abuse Mother  Cancer Mother Possible Breast Cancer  No Known Problems Father  Diabetes Maternal Grandfather  Cancer Paternal Grandmother  Diabetes Paternal Grandfather Past Medical History:  
Diagnosis Date  ADHD  Hypertension  Migraines  Panic attacks  Type 1 diabetes mellitus ([facility] [address] Social History Social History  Marital status:  Spouse name: N/A  
 Number of children: N/A  
 Years of education: N/A Occupational History   Social History Main Topics  Smoking status: Current Every Day Smoker Packs/day: 1.00  Smokeless tobacco: Never Used  Alcohol use No  
 Drug use: No  
 Sexual activity: Yes  
  Partners: Male Birth control/ protection:   
 
Other Topics Concern  Not on file Social History Narrative - Completed High school  
 - 3 children Review of Systems Constitutional: Negative for malaise/fatigue. HENT: Negative for congestion. Respiratory: Negative for cough and shortness of breath. Cardiovascular: Negative for chest pain and palpitations. Gastrointestinal: Negative for abdominal pain, diarrhea, nausea and vomiting. Genitourinary: Negative for dysuria, frequency and urgency. Musculoskeletal: Negative for myalgias. Skin: Negative for rash. Neurological: Negative for dizziness, focal weakness and headaches. Endo/Heme/Allergies: Negative for environmental allergies and polydipsia. Psychiatric/Behavioral: The patient is nervous/anxious and has insomnia. Objective:  
 
Visit Vitals  /80 (BP 1 Location: Left arm, BP Patient Position: Sitting)  Pulse 85  Temp 97.8 °F (36.6 °C) (Oral)  Resp 16  
 Ht 5' 1\" (1.549 m)  Wt 126 lb (57.2 kg)  LMP 09/25/2018  SpO2 98%  BMI 23.81 kg/m2 Physical Exam  
Constitutional: She appears well-developed and well-nourished. No distress. HENT:  
Head: Normocephalic and atraumatic. Cardiovascular: Normal rate, regular rhythm, normal heart sounds and intact distal pulses. Exam reveals no gallop and no friction rub. No murmur heard. Pulmonary/Chest: Effort normal and breath sounds normal.  
Abdominal: Soft. Bowel sounds are normal. She exhibits no distension. There is no tenderness. Nursing note and vitals reviewed. Pertinent Labs/Studies: 
 
 
Assessment and orders: ICD-10-CM ICD-9-CM 1. Type 1 diabetes mellitus with other specified complication ([facility]) W32.11 250.81 REFERRAL TO ENDOCRINOLOGY  
   glucose blood VI test strips (ONETOUCH ULTRA TEST) strip DISCONTINUED: glucose blood VI test strips (ONETOUCH ULTRA TEST) strip 2. Attention deficit hyperactivity disorder (ADHD), combined type F90.2 314.01 dextroamphetamine-amphetamine (ADDERALL) 15 mg tablet REFERRAL TO NEUROPSYCHOLOGY REFERRAL TO PSYCHIATRY 3. Panic attacks F41.0 300.01 REFERRAL TO PSYCHIATRY 4. Refused influenza vaccine Z28.21 V64.06 Diagnoses and all orders for this visit: 
 
1. Type 1 diabetes mellitus with other specified complication [facility]): Getting records. Will refer to endocrinology for monitoring. No changes to current regimen.  
[facility] ref [place] 
-     glucose blood VI test strips (ONETOUCH ULTRA TEST) strip; TEST 5 TIMES A DAY 
 
 2. Attention deficit hyperactivity disorder (ADHD), combined type: Refill of Adderall for now, will get records. Will plan for Neuro-psychiatric testing. 
-     dextroamphetamine-amphetamine (ADDERALL) 15 mg tablet; Take 1 Tab (15 mg total) by mouth two (2) times a day. Max Daily Amount: 30 mg 
-     REFERRAL TO NEUROPSYCHOLOGY 
-     REFERRAL TO PSYCHIATRY 3. Panic attacks:  Psychiatry evaluation for evaluation of further xanax use. -     REFERRAL TO PSYCHIATRY 4. Refused influenza vaccine I have discussed the diagnosis with the patient and the intended plan as seen in the above orders. Social history, medical history, and labs were reviewed. The patient has received an after-visit summary and questions were answered concerning future plans. I have discussed medication side effects and warnings with the patient as well. Sloane Garcia MD 
Resident [facility] 10/11/18 Patient discussed with Dr. Pamela Adair, Attending Physician

## 2018-11-13 ENCOUNTER — OFFICE VISIT (OUTPATIENT)
Dept: FAMILY MEDICINE CLINIC | Age: 28
End: 2018-11-13

## 2018-11-13 VITALS
TEMPERATURE: 98.7 F | RESPIRATION RATE: 20 BRPM | WEIGHT: 121 LBS | DIASTOLIC BLOOD PRESSURE: 79 MMHG | BODY MASS INDEX: 22.84 KG/M2 | HEIGHT: 61 IN | HEART RATE: 90 BPM | OXYGEN SATURATION: 99 % | SYSTOLIC BLOOD PRESSURE: 123 MMHG

## 2018-11-13 DIAGNOSIS — L03.012 CELLULITIS OF FINGER OF LEFT HAND: Primary | ICD-10-CM

## 2018-11-13 DIAGNOSIS — F90.2 ATTENTION DEFICIT HYPERACTIVITY DISORDER (ADHD), COMBINED TYPE: ICD-10-CM

## 2018-11-13 RX ORDER — DOXYCYCLINE 100 MG/1
100 TABLET ORAL 2 TIMES DAILY
Qty: 20 TAB | Refills: 0 | Status: SHIPPED | OUTPATIENT
Start: 2018-11-13 | End: 2018-11-23

## 2018-11-13 RX ORDER — DEXTROAMPHETAMINE SACCHARATE, AMPHETAMINE ASPARTATE, DEXTROAMPHETAMINE SULFATE AND AMPHETAMINE SULFATE 3.75; 3.75; 3.75; 3.75 MG/1; MG/1; MG/1; MG/1
15 TABLET ORAL 2 TIMES DAILY
Qty: 60 TAB | Refills: 0 | Status: SHIPPED | OUTPATIENT
Start: 2018-11-13

## 2018-11-13 NOTE — PROGRESS NOTES
[facility] Subjective:  
Pepper Mukherjee is a 34 y.o. female with history of ADHD, Anxiety, Type 1 diabetes CC: Finger infection, refill ADHD medication History provided by patient and Records HPI: 
Cellulitis: Injury of the left hand middle finger, injury while working on car. Initial injury about 2 weeks ago, persistent erythema and swelling, not improving, but denies injury to joint. Patient denies fevers, chills, N/V. Still has normal ROM with finger and no joint involvement. ADHD: Follow up for ADHD medications. Patient was unable to make it to the neuropsychology appt scheduled for testing. Has been taking medications as prescribed, notes new stressor includes ex- causing stress. PFSH:  
 
Current Outpatient Medications on File Prior to Visit Medication Sig Dispense Refill  dextroamphetamine-amphetamine (ADDERALL) 15 mg tablet Take 1 Tab (15 mg total) by mouth two (2) times a day. Max Daily Amount: 30 mg 60 Tab 0  
 glucose blood VI test strips (ONETOUCH ULTRA TEST) strip TEST 5 TIMES A  Strip 3  
 gabapentin (NEURONTIN) 300 mg capsule Take 100 mg by mouth three (3) times daily.  butalbital-acetaminophen-caffeine (FIORICET, ESGIC) -40 mg per tablet Take 1 Tab by mouth every four (4) hours as needed for Pain or Headache. Max Daily Amount: 6 Tabs. 20 Tab 1  ALPRAZolam (XANAX) 0.5 mg tablet 0.25 mg.    
 GLUCAGON EMERGENCY KIT, HUMAN, 1 mg injection  NOVOLOG 100 unit/mL injection 5 times a day  insulin detemir (LEVEMIR) 100 unit/mL injection by SubCUTAneous route two (2) times a day. No current facility-administered medications on file prior to visit. Patient Active Problem List  
Diagnosis Code  Type 1 diabetes mellitus (HCC) E10.9  Essential hypertension, benign I10  Tobacco use disorder F17.200  Pregnancy Z34.90 Social History Socioeconomic History  Marital status:   
 Spouse name: Not on file  Number of children: Not on file  Years of education: Not on file  Highest education level: Not on file Social Needs  Financial resource strain: Not on file  Food insecurity - worry: Not on file  Food insecurity - inability: Not on file  Transportation needs - medical: Not on file  Transportation needs - non-medical: Not on file Occupational History  Occupation:  Tobacco Use  Smoking status: Current Every Day Smoker Packs/day: 1.00  Smokeless tobacco: Never Used Substance and Sexual Activity  Alcohol use: No  
 Drug use: No  
 Sexual activity: Yes  
  Partners: Male Other Topics Concern  Not on file Social History Narrative - Completed High school  
 - 3 children Review of Systems Constitutional: Negative for chills and fever. Gastrointestinal: Negative for nausea. Musculoskeletal:  
     Finger pain Psychiatric/Behavioral: The patient is nervous/anxious. Objective:  
 
Visit Vitals /79 (BP 1 Location: Left arm, BP Patient Position: Sitting) Pulse 90 Temp 98.7 °F (37.1 °C) Resp 20 Ht 5' 1\" (1.549 m) Wt 121 lb (54.9 kg) SpO2 99% Breastfeeding? No  
BMI 22.86 kg/m² Physical Exam  
Constitutional: She appears well-developed and well-nourished. No distress. Cardiovascular: Normal rate, regular rhythm, normal heart sounds and intact distal pulses. Exam reveals no gallop and no friction rub. No murmur heard. Pulmonary/Chest: Effort normal and breath sounds normal.  
Abdominal: Soft. Bowel sounds are normal.  
Musculoskeletal:  
Healing wound with erythema on the dorsal aspect, no induration or fluctuance. No joint involvement. Minimal tenderness. Skin: Skin is warm and dry. Psychiatric: She has a normal mood and affect. Her behavior is normal.  
Nursing note and vitals reviewed. Pertinent Labs/Studies: 
 
 
Assessment and orders: ICD-10-CM ICD-9-CM 1. Cellulitis of finger of left hand L03.012 681.00 doxycycline (ADOXA) 100 mg tablet 2. Attention deficit hyperactivity disorder (ADHD), combined type F90.2 314.01 dextroamphetamine-amphetamine (ADDERALL) 15 mg tablet Diagnoses and all orders for this visit: 1. Cellulitis of finger of left hand: Cellulitis following finger injury. Strict RTC and ER precautions given. Patient to call back in 1 week to report if improvement in cellulitis at latest. 
-     doxycycline (ADOXA) 100 mg tablet; Take 1 Tab by mouth two (2) times a day for 10 days. 2. Attention deficit hyperactivity disorder (ADHD), combined type: Refilled medication once today.  reviewed, noting patient has filled scripts from recent previous provider. Patient did not see Neuro-psych. Will need to see neuro-psych before getting refills on medications, also needs to see psych for anxiety/ADHD combination, as patient also on Benzo medications. -     dextroamphetamine-amphetamine (ADDERALL) 15 mg tablet; Take 1 Tab (15 mg total) by mouth two (2) times a dayEarliest Fill Date: 11/13/18. Max Daily Amount: 30 mg Follow-up Disposition: 
Return in about 1 month (around 12/13/2018). I have discussed the diagnosis with the patient and the intended plan as seen in the above orders. Social history, medical history, and labs were reviewed. The patient has received an after-visit summary and questions were answered concerning future plans. I have discussed medication side effects and warnings with the patient as well. Aleksander Mcallister MD 
Resident [facility] 11/13/18 Patient discussed with Dr. Christina Salinas, Attending Physician

## 2018-11-13 NOTE — PROGRESS NOTES
I reviewed with the resident the medical history and the resident's findings on the physical examination. I discussed with the resident the patient's diagnosis and concur with the plan. Pt no-showed Neuropsych appt.  also shows that she continues to fill benzodiazepines from a previous provider despite being advised against this. Pt advised she will get one more 30 day refill of ADHD medications from this clinic and will need to see Psych or Neuropsych for further evaluation.

## 2018-11-13 NOTE — PROGRESS NOTES
1. Have you been to the ER, urgent care clinic since your last visit? Hospitalized since your last visit? No 
 
2. Have you seen or consulted any other health care providers outside of the [address] [place] [address] since your last visit? Include any pap smears or colon screening. No 
Reviewed record in preparation for visit and have necessary documentation Pt did not bring medication to office visit for review Goals that were addressed and/or need to be completed during or after this appointment include Health Maintenance Due Topic Date Due  
 FOOT EXAM Q1  08/07/2002  
 EYE EXAM RETINAL OR DILATED Q1  08/07/2002  HPV Age 9Y-34Y (1 - Female 3-dose series) 08/07/2003  Pneumococcal 19-64 Medium Risk (1 of 1 - PPSV23) 08/07/2011  
 DTaP/Tdap/Td series (1 - Tdap) 08/07/2013  
 HEMOGLOBIN A1C Q6M  08/10/2014  MICROALBUMIN Q1  08/19/2014  LIPID PANEL Q1  08/19/2014  MEDICARE YEARLY EXAM  10/11/2018

## 2024-07-18 ENCOUNTER — HOSPITAL ENCOUNTER (OUTPATIENT)
Facility: HOSPITAL | Age: 34
Discharge: HOME | End: 2024-07-18
Payer: MEDICAID

## 2024-07-18 DIAGNOSIS — R22.40 LOCALIZED SWELLING, MASS, OR LUMP OF LOWER EXTREMITY, UNSPECIFIED LATERALITY: ICD-10-CM

## 2024-07-18 PROCEDURE — 93971 EXTREMITY STUDY: CPT
